# Patient Record
Sex: FEMALE | Race: WHITE | NOT HISPANIC OR LATINO | Employment: UNEMPLOYED | URBAN - METROPOLITAN AREA
[De-identification: names, ages, dates, MRNs, and addresses within clinical notes are randomized per-mention and may not be internally consistent; named-entity substitution may affect disease eponyms.]

---

## 2019-08-23 ENCOUNTER — OFFICE VISIT (OUTPATIENT)
Dept: URGENT CARE | Facility: CLINIC | Age: 24
End: 2019-08-23
Payer: COMMERCIAL

## 2019-08-23 VITALS
BODY MASS INDEX: 20.31 KG/M2 | TEMPERATURE: 97.6 F | RESPIRATION RATE: 17 BRPM | HEIGHT: 68 IN | SYSTOLIC BLOOD PRESSURE: 104 MMHG | HEART RATE: 70 BPM | WEIGHT: 134 LBS | OXYGEN SATURATION: 98 % | DIASTOLIC BLOOD PRESSURE: 80 MMHG

## 2019-08-23 DIAGNOSIS — S13.4XXA WHIPLASH INJURY TO NECK, INITIAL ENCOUNTER: ICD-10-CM

## 2019-08-23 DIAGNOSIS — M25.531 RIGHT WRIST PAIN: ICD-10-CM

## 2019-08-23 DIAGNOSIS — S76.012A HIP STRAIN, LEFT, INITIAL ENCOUNTER: ICD-10-CM
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
## 2019-08-23 DIAGNOSIS — S39.012A BACK STRAIN, INITIAL ENCOUNTER: ICD-10-CM

## 2019-08-23 PROCEDURE — 99999 PR NO CHARGE: CPT | Performed by: NURSE PRACTITIONER

## 2019-08-23 PROCEDURE — 99203 OFFICE O/P NEW LOW 30 MIN: CPT | Performed by: NURSE PRACTITIONER

## 2019-08-23 RX ORDER — KETOROLAC TROMETHAMINE 30 MG/ML
30 INJECTION, SOLUTION INTRAMUSCULAR; INTRAVENOUS ONCE
Status: COMPLETED | OUTPATIENT
Start: 2019-08-23 | End: 2019-08-23

## 2019-08-23 RX ORDER — NORGESTIMATE AND ETHINYL ESTRADIOL 7DAYSX3 28
KIT ORAL
Refills: 12 | COMMUNITY
Start: 2019-07-24 | End: 2023-02-15

## 2019-08-23 RX ADMIN — KETOROLAC TROMETHAMINE 30 MG: 30 INJECTION, SOLUTION INTRAMUSCULAR; INTRAVENOUS at 21:40

## 2019-08-23 ASSESSMENT — ENCOUNTER SYMPTOMS
NAUSEA: 0
DIZZINESS: 0
SHORTNESS OF BREATH: 0
ABDOMINAL PAIN: 0
MYALGIAS: 1
SORE THROAT: 0
FATIGUE: 0
BACK PAIN: 1
LOSS OF CONSCIOUSNESS: 0
CHANGE IN BOWEL HABIT: 0
CHILLS: 0
EYE PAIN: 0
HEADACHES: 0
VOMITING: 0
FEVER: 0
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
## 2020-11-28 ENCOUNTER — HOSPITAL ENCOUNTER (OUTPATIENT)
Facility: MEDICAL CENTER | Age: 25
End: 2020-11-28
Attending: PHYSICIAN ASSISTANT
Payer: COMMERCIAL

## 2020-11-28 ENCOUNTER — APPOINTMENT (OUTPATIENT)
Dept: URGENT CARE | Facility: CLINIC | Age: 25
End: 2020-11-28
Payer: COMMERCIAL

## 2020-11-28 ENCOUNTER — OFFICE VISIT (OUTPATIENT)
Dept: URGENT CARE | Facility: CLINIC | Age: 25
End: 2020-11-28
Payer: COMMERCIAL

## 2020-11-28 VITALS
BODY MASS INDEX: 19.7 KG/M2 | SYSTOLIC BLOOD PRESSURE: 120 MMHG | WEIGHT: 130 LBS | HEIGHT: 68 IN | HEART RATE: 107 BPM | RESPIRATION RATE: 14 BRPM | TEMPERATURE: 97.8 F | OXYGEN SATURATION: 96 % | DIASTOLIC BLOOD PRESSURE: 68 MMHG

## 2020-11-28 DIAGNOSIS — R50.9 FEVER, UNSPECIFIED FEVER CAUSE: ICD-10-CM

## 2020-11-28 PROCEDURE — 99214 OFFICE O/P EST MOD 30 MIN: CPT | Performed by: PHYSICIAN ASSISTANT

## 2020-11-28 PROCEDURE — U0003 INFECTIOUS AGENT DETECTION BY NUCLEIC ACID (DNA OR RNA); SEVERE ACUTE RESPIRATORY SYNDROME CORONAVIRUS 2 (SARS-COV-2) (CORONAVIRUS DISEASE [COVID-19]), AMPLIFIED PROBE TECHNIQUE, MAKING USE OF HIGH THROUGHPUT TECHNOLOGIES AS DESCRIBED BY CMS-2020-01-R: HCPCS

## 2020-11-28 ASSESSMENT — ENCOUNTER SYMPTOMS
ABDOMINAL PAIN: 0
MYALGIAS: 1
EYE PAIN: 0
CONSTIPATION: 0
SHORTNESS OF BREATH: 0
DIARRHEA: 0
HEADACHES: 1
FEVER: 1
VOMITING: 0
NAUSEA: 0
SORE THROAT: 0
COUGH: 0
CHILLS: 1

## 2020-11-28 NOTE — LETTER
November 28, 2020    To Whom It May Concern:         This is confirmation that Jorge Galloway attended her scheduled appointment with Silas Duarte P.A.-C. on 11/28/20.   Your employee was seen in our clinic today.  A concern for COVID-19 has been identified and testing is in progress. We are asking you to excuse absences while following self-isolation protocol per Center for Disease Control (CDC) guidelines.  Your employee will be able to access test results through our electronic delivery system called [employer].     If the results of testing are positive, your employee should follow the CDC guidelines.  These are isolation for a minimum of 10 days and at least 24 hours have passed since your last fever without the use of fever-reducing medications and all other symptoms have improved.  The health department may contact you and provide further directions regarding self-isolation and return to work.     Negative result without close contact*:  If your employee was tested due to their symptoms without close contact to someone with COVID-19 and their test is negative: your employee should not return to work or regular activities until 24 hours after symptoms fully improve. (For example, if patient feels back to normal on Tuesday, should remain isolated through Wednesday).      Negative with close contact*:  If your employee was tested due to close contact to someone, they should self isolate for 14 days after their exposure.    Negative with positive household member  If your employee was due to a positive household member they should still quarantine for a period of 14 days.  The 14 day period begins once the household member is isolated. If unable to quarantine (for example mom from infant), the CDC advises an additional 14-day quarantine period from the COVID-19 household member.   In general, repeat testing is not necessary and not offered through our [facility] urgent care.     This is the only note that will  be provided from [employer] for this visit.  Your employee will require an appointment with a primary care provider if FMLA or disability forms are required.  If you have any questions please do not hesitate to call me at the phone number listed below.    Sincerely,    LISA Alba.-C.  [phone]

## 2020-11-28 NOTE — LETTER
November 28, 2020    To Whom It May Concern:         This is confirmation that Jorge Galloway attended her scheduled appointment with Silas Duarte P.A.-C. on 11/28/20.         If you have any questions please do not hesitate to call me at the phone number listed below.    Sincerely,          Silas Duarte P.A.-C.  [phone]

## 2020-11-29 DIAGNOSIS — R50.9 FEVER, UNSPECIFIED FEVER CAUSE: ICD-10-CM

## 2020-11-29 NOTE — PROGRESS NOTES
"Subjective:   Jorge Galloway is a 24 y.o. female who presents for Fever (headache x1 day, 18 days postpartum)      This is a 24-year-old female 18 days status post partum presenting complaining of a fever, body aches, headache that is been ongoing since early yesterday.  She has been treating appropriately with antidiuretics.  I questioned her thoroughly and she denies: Vaginal discharge or pelvic pain, breast redness or breast discharge or breast pain, pain around epidural site or other possible pregnancy complication and she denied all.  She has no known sick contacts other than her hospitalization for delivery.  She has not noticed a cough, shortness of breath, nausea or diarrhea      Review of Systems   Constitutional: Positive for chills, fever and malaise/fatigue.   HENT: Negative for congestion, ear pain and sore throat.    Eyes: Negative for pain.   Respiratory: Negative for cough and shortness of breath.    Cardiovascular: Negative for chest pain.   Gastrointestinal: Negative for abdominal pain, constipation, diarrhea, nausea and vomiting.   Genitourinary: Negative for dysuria.   Musculoskeletal: Positive for myalgias.   Skin: Negative for rash.   Neurological: Positive for headaches.       Medications:    • Tri-Sprintec Tabs    Allergies: Patient has no known allergies.    Problem List: Jorge Galloway does not have a problem list on file.    Surgical History:  No past surgical history on file.    Past Social Hx: Jorge Galloway  reports that she has never smoked. She has never used smokeless tobacco.     Past Family Hx:  Jorge Galloway family history is not on file.     Problem list, medications, and allergies reviewed by myself today in Epic.     Objective:     /68 (BP Location: Left arm, Patient Position: Sitting, BP Cuff Size: Adult)   Pulse (!) 107   Temp 36.6 °C (97.8 °F) (Temporal)   Resp 14   Ht 1.727 m (5' 8\")   Wt 59 kg (130 lb)   SpO2 96%   BMI 19.77 kg/m²     Physical Exam  Vitals signs " reviewed.   Constitutional:       Appearance: Normal appearance.   HENT:      Head: Normocephalic and atraumatic.      Right Ear: External ear normal.      Left Ear: External ear normal.      Nose: Nose normal.      Mouth/Throat:      Mouth: Mucous membranes are moist.   Eyes:      Conjunctiva/sclera: Conjunctivae normal.   Cardiovascular:      Rate and Rhythm: Normal rate and regular rhythm.      Heart sounds: Normal heart sounds.   Pulmonary:      Effort: Pulmonary effort is normal.   Skin:     General: Skin is warm and dry.      Capillary Refill: Capillary refill takes less than 2 seconds.   Neurological:      Mental Status: She is alert and oriented to person, place, and time.         Assessment/Plan:     Diagnosis and associated orders:     1. Fever, unspecified fever cause  COVID/SARS COV-2 PCR      Comments/MDM:     • I was concerned about mastitis versus other pregnancy or breast-feeding concerns none of which are consistent with her history and physical.  I am concerned about a viral infection.  She has been taking very low doses of antipyretics and I encouraged her to take reasonable doses.  She measured a fever up to 102 yesterday so I do believe that there is something going.  I counseled her to remain vigilant if any other symptoms surface that change the nature of her illness to return for evaluation or consider going to the ER based on severity  Patient's vital signs are reassuring and they appear hemodynamically stable and do not require higher level care at this time  I discussed self isolation and provided printed instructions (if applicable)  I discussed ER precautions and provided printed instructions (if applicable)  I educated the patient on possibility of a false-negative test and indications for repeat testing  I instructed the patient to try to follow up with their PCP (if applicable) for follow up care  I provided the patient the printed AVS which contains information about signing up for  [employer]   I will contact the patient via [employer] with Covid results.  If requested, I provided the patient with a work note to provide to their employer or school regarding returning to work and discontinuation of self isolation.  All questions were answered and patient demonstrated verbal understanding of above.  I followed all reasonable PPE precautions during this encounter including but not limited to use of an N95 mask, gloves, and gown if indicated.    •          Differential diagnosis, natural history, supportive care, and indications for immediate follow-up discussed.    Advised the patient to follow-up with the primary care physician for recheck, reevaluation, and consideration of further management.    Please note that this dictation was created using voice recognition software. I have made a reasonable attempt to correct obvious errors, but I expect that there are errors of grammar and possibly content that I did not discover before finalizing the note.    This note was electronically signed by Silas Duarte PA-C

## 2020-11-30 LAB
COVID ORDER STATUS COVID19: NORMAL
SARS-COV-2 RNA RESP QL NAA+PROBE: NOTDETECTED
SPECIMEN SOURCE: NORMAL

## 2020-12-01 NOTE — RESULT ENCOUNTER NOTE
I spoke with the patient to inform them of their NEGATIVE Covid-19 PCR testing results.  I referenced their AVS paperwork and our office visit reiterating supportive care, ER precautions, and possible continuation of isolation.  I referred them to the CDC guidelines or those of their employer about returning to work/school.

## 2021-10-26 ENCOUNTER — TELEPHONE (OUTPATIENT)
Dept: SCHEDULING | Facility: IMAGING CENTER | Age: 26
End: 2021-10-26

## 2021-11-02 ENCOUNTER — OFFICE VISIT (OUTPATIENT)
Dept: MEDICAL GROUP | Facility: CLINIC | Age: 26
End: 2021-11-02
Payer: COMMERCIAL

## 2021-11-02 VITALS
SYSTOLIC BLOOD PRESSURE: 124 MMHG | TEMPERATURE: 98 F | RESPIRATION RATE: 12 BRPM | HEART RATE: 100 BPM | HEIGHT: 68 IN | BODY MASS INDEX: 20 KG/M2 | DIASTOLIC BLOOD PRESSURE: 85 MMHG | OXYGEN SATURATION: 96 % | WEIGHT: 132 LBS

## 2021-11-02 DIAGNOSIS — Z72.0 NICOTINE USE: ICD-10-CM

## 2021-11-02 DIAGNOSIS — Z3A.01 LESS THAN 8 WEEKS GESTATION OF PREGNANCY: ICD-10-CM

## 2021-11-02 DIAGNOSIS — Z71.85 VACCINE COUNSELING: ICD-10-CM

## 2021-11-02 PROCEDURE — 99214 OFFICE O/P EST MOD 30 MIN: CPT | Performed by: STUDENT IN AN ORGANIZED HEALTH CARE EDUCATION/TRAINING PROGRAM

## 2021-11-02 NOTE — ASSESSMENT & PLAN NOTE
Offered nicotine patches which patient declined for now  Recommended further discussion with OB  Recommended she abstain from nicotine and soon as possible given developing fetus especially during the first 8 weeks  Follow-up as needed

## 2021-11-02 NOTE — ASSESSMENT & PLAN NOTE
Continue to follow with Dr. Loya with OB.  If other issues arise happy to consult during pregnancy.

## 2021-11-02 NOTE — ASSESSMENT & PLAN NOTE
Recommended patient get Covid vaccine as soon as possible  Commended she avoid Likeability in favor of Pfizer or maternal vaccines given theoretical risk of clots although I suspect that risk is actually lower than the incidence in the community at baseline.  Follow-up as needed  Continue with OB

## 2021-11-02 NOTE — PROGRESS NOTES
Subjective:     CC: vaccine counseling and nicotine use    HPI:   Jorge presents today with     Problem   Vaccine Counseling    Patient here to discuss Covid vaccine. She recently found out she was pregnant. She is about 5 weeks pregnant based on her LMP. She recently got her flu shot 1 week ago. She was asking if it was safe enough to get the Covid vaccine during pregnancy and following flu shot. She has no previous vaccine reactions and no previous anaphylaxis. She has no OB initial appointment next Monday 6 days from now.     Nicotine Use    Patient is currently vaping. She picked this up after her last pregnancy. She would like to quit during this pregnancy. We discussed the options of patches and warned against overdose if using with vape pens. I discussed that vape pens have a higher concentration of nicotine than cigarettes. She states she has hit her vape pen in the last couple days and has been chewing gums a lot more frequently. She would like to discuss this further with OB.     Less Than 8 Weeks Gestation of Pregnancy    Patient recently took a home pregnancy test. She states this was positive. By her last LMP she would be roughly 5 weeks. She has an initial OB visit set up with Dr. Loya. She has no concerns at this point. She is at a healthy weight with a BMI of 20. She has no previous issues with high blood pressure or preeclampsia in previous pregnancy.         Current Outpatient Medications Ordered in Epic   Medication Sig Dispense Refill   • TRI-SPRINTEC 0.18/0.215/0.25 MG-35 MCG Tab TK 1 T PO QD (Patient not taking: Reported on 11/2/2021)  12     No current [facility]-ordered facility-administered medications on file.       ROS:  Gen: no fevers/chills, no changes in weight  Eyes: no changes in vision  ENT: no sore throat, no hearing loss, no bloody nose  Pulm: no SOB, no cough  CV: no chest pain, no palpitations  GI: no nausea/vomiting, no diarrhea  : no dysuria  MSk: no myalgias  Skin: no  "rash  Neuro: no headaches, no numbness/tingling  Heme/Lymph: no easy bruising      Objective:     Exam:  /85 (BP Location: Right arm, Patient Position: Sitting, BP Cuff Size: Adult)   Pulse 100   Temp 36.7 °C (98 °F) (Temporal)   Resp 12   Ht 1.727 m (5' 8\")   Wt 59.9 kg (132 lb)   SpO2 96%   BMI 20.07 kg/m²  Body mass index is 20.07 kg/m².    Gen: Alert and oriented, No apparent distress.  HEENT: PERRL, EOMI, external ears normal bilat, nose roughly midline, atraumatic, normocephalic  Neck: Neck is supple without lymphadenopathy.  Lungs: CTA bilaterally, no wheezes, rhonchi, or rales, symmetric chest rise  CV: Regular rate and rhythm. No murmurs, rubs, or gallops.  GI:       No rebound, no guarding, normal bowel sounds  :      No CVA tenderness, bladder non-tender to palp  Ext: No clubbing, cyanosis, edema.  Neuro: Gait appropriate for age, no focal deficits  Psych: Affect and mood congruent without abnormality  Skin:    No rashes, no jaundice      Labs: none    Assessment & Plan:     25 y.o. female with the following -     Problem List Items Addressed This Visit     Vaccine counseling     Recommended patient get Covid vaccine as soon as possible  Commended she avoid CL3VER in favor of Pfizer or maternal vaccines given theoretical risk of clots although I suspect that risk is actually lower than the incidence in the community at baseline.  Follow-up as needed  Continue with OB         Nicotine use     Offered nicotine patches which patient declined for now  Recommended further discussion with OB  Recommended she abstain from nicotine and soon as possible given developing fetus especially during the first 8 weeks  Follow-up as needed           Less than 8 weeks gestation of pregnancy     Continue to follow with Dr. Loya with OB.  If other issues arise happy to consult during pregnancy.               I spent a total of 30 minutes with record review, exam, communication with the patient, " communication with other providers, and documentation of this encounter.      Return if symptoms worsen or fail to improve.    Please note that this dictation was created using voice recognition software. I have made every reasonable attempt to correct obvious errors, but I expect that there are errors of grammar and possibly content that I did not discover before finalizing the note.

## 2023-02-15 ENCOUNTER — OFFICE VISIT (OUTPATIENT)
Dept: MEDICAL GROUP | Facility: CLINIC | Age: 28
End: 2023-02-15
Payer: COMMERCIAL

## 2023-02-15 VITALS
SYSTOLIC BLOOD PRESSURE: 99 MMHG | HEART RATE: 60 BPM | DIASTOLIC BLOOD PRESSURE: 63 MMHG | OXYGEN SATURATION: 95 % | WEIGHT: 132 LBS | TEMPERATURE: 97.8 F | BODY MASS INDEX: 20 KG/M2 | RESPIRATION RATE: 12 BRPM | HEIGHT: 68 IN

## 2023-02-15 DIAGNOSIS — Z00.00 HEALTHCARE MAINTENANCE: ICD-10-CM

## 2023-02-15 DIAGNOSIS — M54.50 LUMBAR BACK PAIN: ICD-10-CM

## 2023-02-15 PROCEDURE — 99213 OFFICE O/P EST LOW 20 MIN: CPT | Mod: GE

## 2023-02-16 NOTE — PROGRESS NOTES
"Subjective:     CC: Establish care and Nexplanon desired.    HPI:   Jorge is 26 yo female with unremarkable PMHx, however, significant for her  status who is here to establish care. Patient states she has been wanting to get an IUD or nexplanon placed for a couple months however, was not appropriately followed up with from her previous OBGYN office. Patient delivered her most recent baby 2 months ago and since then has been feeling well. Patient is interested in placing a Nexplanon as soon as possible. She denies irregular or heavy periods. Patient has no other comorbidities and takes no medications.       ROS:  Gen: no fevers/chills, no changes in weight  Pulm: no sob, no cough  CV: no chest pain, no palpitations  GI: no nausea/vomiting, no diarrhea  Objective:     Exam:  BP 99/63 (BP Location: Left arm, Patient Position: Sitting, BP Cuff Size: Adult)   Pulse 60   Temp 36.6 °C (97.8 °F) (Temporal)   Resp 12   Ht 1.727 m (5' 8\")   Wt 59.9 kg (132 lb)   SpO2 95%   BMI 20.07 kg/m²  Body mass index is 20.07 kg/m².    Gen: Alert and oriented, No apparent distress.  Neck: Neck is supple without lymphadenopathy.  Lungs: Normal effort, CTA bilaterally, no wheezes, rhonchi, or rales  CV: Regular rate and rhythm. No murmurs, rubs, or gallops.  Ext: No clubbing, cyanosis, edema.    Labs: No new labs ordered today.  Assessment & Plan:   27 y.o. female with the following:    #Lumbar back pain  -continue supportive treatment with NSAIDs OTC and warm compresses  -patient was advised to notify if any new or concerning symptoms present; numbness or tingling in upper arms or back, loss of sensation or motor function in upper extremities  -negative neuro examination today  -consider imaging if pain persists following supportive measures mentioned above    #Contraception  -patient interested in placing Nexplanon  -side effects and procedure disgusts in detail today  -Nexplanon application provided today; will obtain and " submit for ordering/processing.  -patient will be contacted when Nexplanon becomes available in our clinic    #Care Gaps:  -PAP done previously; will obtain results and update patient's chart    Regine Paula M.D., PGY1  Resident Intern  [employer], Family Medicine

## 2023-03-27 ENCOUNTER — APPOINTMENT (OUTPATIENT)
Dept: MEDICAL GROUP | Facility: CLINIC | Age: 28
End: 2023-03-27
Payer: COMMERCIAL

## 2023-04-06 ENCOUNTER — OFFICE VISIT (OUTPATIENT)
Dept: MEDICAL GROUP | Facility: CLINIC | Age: 28
End: 2023-04-06
Payer: COMMERCIAL

## 2023-04-06 VITALS
HEIGHT: 68 IN | OXYGEN SATURATION: 98 % | DIASTOLIC BLOOD PRESSURE: 64 MMHG | SYSTOLIC BLOOD PRESSURE: 100 MMHG | BODY MASS INDEX: 20.61 KG/M2 | TEMPERATURE: 97.8 F | HEART RATE: 77 BPM | WEIGHT: 136 LBS

## 2023-04-06 DIAGNOSIS — Z30.017 NEXPLANON INSERTION: ICD-10-CM

## 2023-04-06 DIAGNOSIS — Z30.017 INSERTION OF NEXPLANON: ICD-10-CM

## 2023-04-06 LAB
POCT INT CON NEG: NEGATIVE
POCT INT CON POS: POSITIVE
POCT URINE PREGNANCY TEST: NEGATIVE

## 2023-04-06 PROCEDURE — 11981 INSERTION DRUG DLVR IMPLANT: CPT

## 2023-04-06 PROCEDURE — 81025 URINE PREGNANCY TEST: CPT

## 2023-04-06 ASSESSMENT — PATIENT HEALTH QUESTIONNAIRE - PHQ9: CLINICAL INTERPRETATION OF PHQ2 SCORE: 0
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
## 2023-04-06 NOTE — PROCEDURES
Procedure Note: Nexplanon Insertion     Today I discussed with the patient subdermal implant, Nexplanon.  We discussed its mechanism of action and how it prevents pregnancy.  Discussed that the implant is a progesterone only form of contraception.  Also discussed with patient risks associated with placement of the device which can include infection bruising and pain. We also discussed the possibility of the device can be displaced. We discussed that the device would be palpable under the skin of the arm.  I also discussed with the patient side effects of the device which can include but are not limited to, irregular bleeding which can include amenorrhea, irregular spotting and also worsening of menstrual cycles. There is an increased risk of headaches related to the device and potential for weight gain.  After thorough discussion the patient had opportunity to ask questions regarding the device and at this time desires the device to be placed today.  Patient information handout has been given for the device.    After informed consent and discussion of risks and benefits, patient who describes her dominant hand as right, was prepped and draped to expose the inner medial surface of the upper left.  Insertion site marked at 8 cm distal to the medial epicondyl  1% lidocaine placed sub-Q along course of implant with area totally anesthestized.   Nexplanon applicator placed with bevel of needle down at 30 deg angle. Skin lifted, needle angle changed to parallel to skin and advanced to hub. Trigger fired and nexplanon released.  Implant palpated by myself and patient in proper location.   Small needle puncture hemostatic   Steri Strips placed   Arm wrapped with self sealing gauze     Patient given Postoperative Advice  Take NSAIDS and tylenol for pain, ice packs prn bruising/discomfort.  Remove gauze wrap after 24 hrs, or at anytime it becomes uncomfortable.  Steri Strips to be removed at 3-4 days.  Instructed patient to  use barrier method for up to 7 days if sexually active.  RTC as needed.    Regine Paula MD  Resident Intern  [facility], Family Medicine

## 2023-04-07 NOTE — ASSESSMENT & PLAN NOTE
Consent signed prior to Nexplanon insertion. Nexplanon placed today with success. patient tolerated procedure well.  Consent signed prior to procedure.   -Please see procedure note from today for 4/6/2023.  -Return to clinic and postprocedure instructions discussed with patient.  -Informed patient to continue barrier method for contraception for up to 7 days following insertion.

## 2023-06-19 ENCOUNTER — OFFICE VISIT (OUTPATIENT)
Dept: MEDICAL GROUP | Facility: CLINIC | Age: 28
End: 2023-06-19
Payer: COMMERCIAL

## 2023-06-19 VITALS — BODY MASS INDEX: 19.91 KG/M2 | HEIGHT: 68 IN | WEIGHT: 131.4 LBS

## 2023-06-19 DIAGNOSIS — H93.8X2 SENSATION OF FULLNESS IN LEFT EAR: ICD-10-CM

## 2023-06-19 PROCEDURE — 99213 OFFICE O/P EST LOW 20 MIN: CPT | Mod: GE

## 2023-06-19 NOTE — PROGRESS NOTES
"Subjective:     CC: Ear fullness.    HPI:   Jorge is a 27-year-old female with unremarkable past medical history who presents today for ear fullness.  Patient denies ear pain, nausea, vomiting, dizziness, symptoms of vertigo and/or jaw pain or tightness.  No active drainage or bleeding from ear.  Patient's symptoms are mostly left-sided.  Right ear completely asymptomatic.  History of seasonal allergies.  No other concerns at this time.    ROS:  Gen: no fevers/chills, no changes in weight  Pulm: no sob, no cough  CV: no chest pain, no palpitations  GI: no nausea/vomiting, no diarrhea  Objective:   Exam:  BP (P) 122/70 (BP Location: Right arm, Patient Position: Sitting, BP Cuff Size: Adult)   Pulse (P) 74   Temp (P) 36.9 °C (98.5 °F) (Temporal)   Ht 1.727 m (5' 8\")   Wt 59.6 kg (131 lb 6.4 oz)   SpO2 (P) 99%   BMI 19.98 kg/m²  Body mass index is 19.98 kg/m².    Gen: Alert and oriented, No apparent distress.  HEENT: TM intact, no bulging or surrounding erythema.  No active drainage or bleeding on exam.  No cerumen impaction noted.  No mastoid tenderness or pain with pinna manipulation.  Neck is supple without lymphadenopathy.  Lungs: Normal effort, CTA bilaterally, no wheezes, rhonchi, or rales  CV: Regular rate and rhythm. No murmurs, rubs, or gallops.  Ext: No clubbing, cyanosis, edema.    Labs: No new labs ordered or reviewed during today's visit.   Assessment & Plan:   27 y.o. female with the following:    Problem List Items Addressed This Visit       Sensation of fullness in left ear     Left-sided complaints.  Right ear within normal limits.  Denies ear pain at this time.  No mastoid tenderness or pain with movement of pinna.  On physical exam ear canal without discharge, surrounding erythema, no bulging of TM.   Rhinnes wnl; air conduction > bone conduction.  Webers wnl; No lateralization of sound.   Considering Eustachian tube dysfunction vs. TMJ dysfunction.  -We will continue to monitor for worsening " or continued symptoms over the next 2 to 4 weeks.  -Return precautions provided to patient today.  -We will consider Flonase to relieve possible allergic type symptoms related to pressure and congestion.  -Consider imaging and/or ENT referral; if progression of symptoms or new symptoms present.          At next visit:  -Just discussed ongoing symptoms of ear fullness.  Consider Flonase, or imaging if indicated.    Regine Paula MD  Resident Intern  [facility], Family Medicine

## 2023-06-19 NOTE — ASSESSMENT & PLAN NOTE
Left-sided complaints.  Right ear within normal limits.  Denies ear pain at this time.  No mastoid tenderness or pain with movement of pinna.  On physical exam ear canal without discharge, surrounding erythema, no bulging of TM.   Rhinnes wnl; air conduction > bone conduction.  Webers wnl; No lateralization of sound.   Considering Eustachian tube dysfunction vs. TMJ dysfunction.  -We will continue to monitor for worsening or continued symptoms over the next 2 to 4 weeks.  -Return precautions provided to patient today.  -We will consider Flonase to relieve possible allergic type symptoms related to pressure and congestion.  -Consider imaging and/or ENT referral; if progression of symptoms or new symptoms present.

## 2024-01-29 ENCOUNTER — APPOINTMENT (OUTPATIENT)
Dept: LAB | Facility: MEDICAL CENTER | Age: 29
End: 2024-01-29
Payer: COMMERCIAL

## 2024-02-15 ENCOUNTER — OFFICE VISIT (OUTPATIENT)
Dept: MEDICAL GROUP | Facility: CLINIC | Age: 29
End: 2024-02-15
Payer: COMMERCIAL

## 2024-02-15 VITALS
SYSTOLIC BLOOD PRESSURE: 117 MMHG | HEIGHT: 68 IN | TEMPERATURE: 97.7 F | HEART RATE: 67 BPM | OXYGEN SATURATION: 98 % | BODY MASS INDEX: 21.07 KG/M2 | WEIGHT: 139 LBS | DIASTOLIC BLOOD PRESSURE: 75 MMHG

## 2024-02-15 DIAGNOSIS — R11.2 NAUSEA AND VOMITING, UNSPECIFIED VOMITING TYPE: ICD-10-CM

## 2024-02-15 DIAGNOSIS — R35.0 URINARY FREQUENCY: ICD-10-CM

## 2024-02-15 DIAGNOSIS — Z30.017 INSERTION OF NEXPLANON: ICD-10-CM

## 2024-02-15 DIAGNOSIS — Z00.00 PREVENTATIVE HEALTH CARE: ICD-10-CM

## 2024-02-15 DIAGNOSIS — R53.83 OTHER FATIGUE: ICD-10-CM

## 2024-02-15 LAB
APPEARANCE UR: CLEAR
BILIRUB UR STRIP-MCNC: NORMAL MG/DL
COLOR UR AUTO: YELLOW
GLUCOSE UR STRIP.AUTO-MCNC: NORMAL MG/DL
KETONES UR STRIP.AUTO-MCNC: NORMAL MG/DL
LEUKOCYTE ESTERASE UR QL STRIP.AUTO: NORMAL
NITRITE UR QL STRIP.AUTO: NORMAL
PH UR STRIP.AUTO: 7 [PH] (ref 5–8)
POCT INT CON NEG: NEGATIVE
POCT INT CON POS: POSITIVE
POCT URINE PREGNANCY TEST: NEGATIVE
PROT UR QL STRIP: NORMAL MG/DL
RBC UR QL AUTO: NORMAL
SP GR UR STRIP.AUTO: 1.01
UROBILINOGEN UR STRIP-MCNC: 0.2 MG/DL

## 2024-02-15 PROCEDURE — 99213 OFFICE O/P EST LOW 20 MIN: CPT | Mod: 25,GE

## 2024-02-15 PROCEDURE — 3078F DIAST BP <80 MM HG: CPT | Mod: GC

## 2024-02-15 PROCEDURE — 90686 IIV4 VACC NO PRSV 0.5 ML IM: CPT

## 2024-02-15 PROCEDURE — 90471 IMMUNIZATION ADMIN: CPT

## 2024-02-15 PROCEDURE — 81002 URINALYSIS NONAUTO W/O SCOPE: CPT | Mod: GC

## 2024-02-15 PROCEDURE — 81025 URINE PREGNANCY TEST: CPT | Mod: GC

## 2024-02-15 PROCEDURE — 3074F SYST BP LT 130 MM HG: CPT | Mod: GC

## 2024-02-16 NOTE — PROGRESS NOTES
"Subjective:     CC: Nexplanon check.    HPI:   Jorge presents today to ensure nexplanon is still in place. Recently got a deep tissue massage and since then she felt her nexplanon is displaced, as she was unable to palpate it. Patient also states she has been experiencing some intermittent morning nausea, since this massage as well.     No other complaints during this time.    ROS:  Gen: no fevers/chills, no changes in weight  Pulm: no sob, no cough  CV: no chest pain, no palpitations  GI: no nausea/vomiting, no diarrhea    Objective:     Exam:  /75 (BP Location: Right arm, Patient Position: Sitting, BP Cuff Size: Adult)   Pulse 67   Temp 36.5 °C (97.7 °F) (Temporal)   Ht 1.727 m (5' 8\")   Wt 63 kg (139 lb)   SpO2 98%   BMI 21.13 kg/m²  Body mass index is 21.13 kg/m².    Gen: Alert and oriented, No apparent distress.  Neck: Neck is supple without lymphadenopathy.  Lungs: Normal effort, CTA bilaterally, no wheezes, rhonchi, or rales  CV: Regular rate and rhythm. No murmurs, rubs, or gallops.  Ext: No clubbing, cyanosis, edema.    Labs:   Results for orders placed or performed in visit on 02/15/24   POCT Urinalysis   Result Value Ref Range    POC Color yellow Negative    POC Appearance clear Negative    POC Glucose neg Negative mg/dL    POC Bilirubin neg Negative mg/dL    POC Ketones neg Negative mg/dL    POC Specific Gravity 1.015 <1.005 - >1.030    POC Blood neg Negative    POC Urine PH 7.0 5.0 - 8.0    POC Protein neg Negative mg/dL    POC Urobiligen 0.2 Negative (0.2) mg/dL    POC Nitrites neg Negative    POC Leukocyte Esterase trace Negative   POCT Pregnancy   Result Value Ref Range    POC Urine Pregnancy Test Negative     Internal Control Positive Positive     Internal Control Negative Negative      Assessment & Plan:     28 y.o. female with the following:     Problem List Items Addressed This Visit       Insertion of Nexplanon     Successfully placed last year 4/2023. Asymptomatic and doing well " since placement. Recent deep tissue massage and patient was concerned for displacement. Identified correct location of nexplanon in left upper arm today.     Some nausea since deep tissue massage.    Plan:  - UA and b-HCG today; all negative with exception of +LE. Patient asymptomatic from UTI symptoms.              Other Visit Diagnoses       Preventative health care        Relevant Orders    INFLUENZA VACCINE QUAD INJ (PF) (Completed)    Urinary frequency        Relevant Orders    POCT Urinalysis (Completed)    Other fatigue        Relevant Orders    CBC WITH DIFFERENTIAL    Comp Metabolic Panel    TSH    HEMOGLOBIN A1C    POCT Pregnancy (Completed)    Nausea and vomiting, unspecified vomiting type        Relevant Orders    POCT Pregnancy (Completed)           Advised to follow up for further workup in 1-2 weeks if symptoms of nausea return or persist. Pregnancy test today negative.    Regine Paula M.D.  PGY2 Resident  [facility], [facility]

## 2024-02-16 NOTE — ASSESSMENT & PLAN NOTE
Successfully placed last year 4/2023. Asymptomatic and doing well since placement. Recent deep tissue massage and patient was concerned for displacement. Identified correct location of nexplanon in left upper arm today.     Some nausea since deep tissue massage.    Plan:  - UA and b-HCG today; all negative with exception of +LE. Patient asymptomatic from UTI symptoms.

## 2024-06-27 ENCOUNTER — OFFICE VISIT (OUTPATIENT)
Dept: MEDICAL GROUP | Facility: CLINIC | Age: 29
End: 2024-06-27
Payer: COMMERCIAL

## 2024-06-27 VITALS
SYSTOLIC BLOOD PRESSURE: 105 MMHG | RESPIRATION RATE: 12 BRPM | OXYGEN SATURATION: 92 % | HEIGHT: 68 IN | BODY MASS INDEX: 20.76 KG/M2 | DIASTOLIC BLOOD PRESSURE: 69 MMHG | TEMPERATURE: 98.2 F | WEIGHT: 137 LBS | HEART RATE: 76 BPM

## 2024-06-27 DIAGNOSIS — M54.2 NECK PAIN: ICD-10-CM

## 2024-06-27 RX ORDER — METHOCARBAMOL 500 MG/1
1000 TABLET, FILM COATED ORAL 4 TIMES DAILY
Qty: 24 TABLET | Refills: 0 | Status: SHIPPED | OUTPATIENT
Start: 2024-06-27 | End: 2024-06-30

## 2024-06-27 NOTE — PROGRESS NOTES
"Subjective:     CC: Neck and shoulder pain.    HPI:   Jorge is a 28-year-old female who presents today for:    Neck and shoulder pain: Acute, over past few months.  Uncontrolled and feels symptoms are worsening more recently. Complaints include tightness, stiffness with movement pain in left shoulder and neck area.  No history of trauma or injury.  Right side dominant, however left side is affected.  Patient states that she does use her left arm to carry her children.  Mornings are particularly difficult as patient wakes up with tightness on the left side.  Patient does not complain of weakness in the muscles however feels distracted in her appointment as she feels strained.    No other concerns were addressed during today's visit.  No previous history of PT or injections.  We discussed the benefits of OMT and other muscle release techniques.  Patient is interested in this form of treatment.  Please see assessment plan for further details.    ROS:  Gen: no fevers/chills, no changes in weight  Pulm: no sob, no cough  CV: no chest pain, no palpitations  GI: no nausea/vomiting, no diarrhea    Objective:     Exam:  /69 (BP Location: Left arm, Patient Position: Sitting, BP Cuff Size: Adult)   Pulse 76   Temp 36.8 °C (98.2 °F) (Temporal)   Resp 12   Ht 1.727 m (5' 8\")   Wt 62.1 kg (137 lb)   SpO2 92%   BMI 20.83 kg/m²  Body mass index is 20.83 kg/m².    Gen: Alert and oriented, No apparent distress.  Neck: Neck is supple without lymphadenopathy.  Lungs: Normal effort, CTA bilaterally, no wheezes, rhonchi, or rales  CV: Regular rate and rhythm. No murmurs, rubs, or gallops.  MSK:  shoulder: forward flexion, abduction and ext/int rotation wnl. negative neer, hawkin, and lift off testing. Empty can test negative. Apprehension test negative.   Ext: No clubbing, cyanosis, edema.    Labs:  Labs pending.  Results for orders placed or performed in visit on 02/15/24   POCT Urinalysis   Result Value Ref Range    POC " Color yellow Negative    POC Appearance clear Negative    POC Glucose neg Negative mg/dL    POC Bilirubin neg Negative mg/dL    POC Ketones neg Negative mg/dL    POC Specific Gravity 1.015 <1.005 - >1.030    POC Blood neg Negative    POC Urine PH 7.0 5.0 - 8.0    POC Protein neg Negative mg/dL    POC Urobiligen 0.2 Negative (0.2) mg/dL    POC Nitrites neg Negative    POC Leukocyte Esterase trace Negative   POCT Pregnancy   Result Value Ref Range    POC Urine Pregnancy Test Negative     Internal Control Positive Positive     Internal Control Negative Negative      Assessment & Plan:     28 y.o. female with the following:     Problem List Items Addressed This Visit       Neck pain     Likely muscular. Shoulder exam grossly negative.  Positive for muscle tightness, restricted range of motion due to tightness and straining.  Uses ibuprofen intermittently which provides some relief.  No topicals.  Patient physical exam is further details.    Plan:  -Refer to Dr. Lizet Mas for OMT, appt scheduled  -Diclofenac topical PRN  -Robaxin nightly PRN  -Consider PT, imaging or other relaxation techniques if failed above modalities.             At next follow up visit in 4 weeks:  - Ensure OMT is working well for patient  - Ensure muscle relaxant relieved patient's     Regine Paula M.D.  PGY2 Resident  [facility], [facility]

## 2024-06-28 PROBLEM — M54.2 NECK PAIN: Status: ACTIVE | Noted: 2024-06-28

## 2024-06-28 NOTE — ASSESSMENT & PLAN NOTE
Likely muscular. Shoulder exam grossly negative.  Positive for muscle tightness, restricted range of motion due to tightness and straining.  Uses ibuprofen intermittently which provides some relief.  No topicals.  Patient physical exam is further details.    Plan:  -Refer to Dr. Lizet Mas for OMT, appt scheduled  -Diclofenac topical PRN  -Robaxin nightly PRN  -Consider PT, imaging or other relaxation techniques if failed above modalities.

## 2024-07-01 ENCOUNTER — HOSPITAL ENCOUNTER (OUTPATIENT)
Facility: MEDICAL CENTER | Age: 29
End: 2024-07-01
Payer: COMMERCIAL

## 2024-07-01 ENCOUNTER — HOSPITAL ENCOUNTER (OUTPATIENT)
Dept: LAB | Facility: MEDICAL CENTER | Age: 29
End: 2024-07-01
Payer: COMMERCIAL

## 2024-07-01 DIAGNOSIS — R53.83 OTHER FATIGUE: ICD-10-CM

## 2024-07-01 LAB
ALBUMIN SERPL BCP-MCNC: 4.5 G/DL (ref 3.2–4.9)
ALBUMIN/GLOB SERPL: 1.7 G/DL
ALP SERPL-CCNC: 47 U/L (ref 30–99)
ALT SERPL-CCNC: 12 U/L (ref 2–50)
ANION GAP SERPL CALC-SCNC: 10 MMOL/L (ref 7–16)
AST SERPL-CCNC: 14 U/L (ref 12–45)
BASOPHILS # BLD AUTO: 0.6 % (ref 0–1.8)
BASOPHILS # BLD: 0.05 K/UL (ref 0–0.12)
BILIRUB SERPL-MCNC: 0.5 MG/DL (ref 0.1–1.5)
BUN SERPL-MCNC: 14 MG/DL (ref 8–22)
CALCIUM ALBUM COR SERPL-MCNC: 9.1 MG/DL (ref 8.5–10.5)
CALCIUM SERPL-MCNC: 9.5 MG/DL (ref 8.5–10.5)
CHLORIDE SERPL-SCNC: 102 MMOL/L (ref 96–112)
CO2 SERPL-SCNC: 25 MMOL/L (ref 20–33)
CREAT SERPL-MCNC: 0.76 MG/DL (ref 0.5–1.4)
EOSINOPHIL # BLD AUTO: 0.36 K/UL (ref 0–0.51)
EOSINOPHIL NFR BLD: 4.4 % (ref 0–6.9)
ERYTHROCYTE [DISTWIDTH] IN BLOOD BY AUTOMATED COUNT: 42.7 FL (ref 35.9–50)
EST. AVERAGE GLUCOSE BLD GHB EST-MCNC: 108 MG/DL
GFR SERPLBLD CREATININE-BSD FMLA CKD-EPI: 108 ML/MIN/1.73 M 2
GLOBULIN SER CALC-MCNC: 2.7 G/DL (ref 1.9–3.5)
GLUCOSE SERPL-MCNC: 86 MG/DL (ref 65–99)
HBA1C MFR BLD: 5.4 % (ref 4–5.6)
HCT VFR BLD AUTO: 42 % (ref 37–47)
HGB BLD-MCNC: 13.8 G/DL (ref 12–16)
IMM GRANULOCYTES # BLD AUTO: 0.01 K/UL (ref 0–0.11)
IMM GRANULOCYTES NFR BLD AUTO: 0.1 % (ref 0–0.9)
LYMPHOCYTES # BLD AUTO: 2.7 K/UL (ref 1–4.8)
LYMPHOCYTES NFR BLD: 32.9 % (ref 22–41)
MCH RBC QN AUTO: 29.6 PG (ref 27–33)
MCHC RBC AUTO-ENTMCNC: 32.9 G/DL (ref 32.2–35.5)
MCV RBC AUTO: 89.9 FL (ref 81.4–97.8)
MONOCYTES # BLD AUTO: 0.45 K/UL (ref 0–0.85)
MONOCYTES NFR BLD AUTO: 5.5 % (ref 0–13.4)
NEUTROPHILS # BLD AUTO: 4.64 K/UL (ref 1.82–7.42)
NEUTROPHILS NFR BLD: 56.5 % (ref 44–72)
NRBC # BLD AUTO: 0 K/UL
NRBC BLD-RTO: 0 /100 WBC (ref 0–0.2)
PLATELET # BLD AUTO: 378 K/UL (ref 164–446)
PMV BLD AUTO: 10.2 FL (ref 9–12.9)
POTASSIUM SERPL-SCNC: 4.9 MMOL/L (ref 3.6–5.5)
PROT SERPL-MCNC: 7.2 G/DL (ref 6–8.2)
RBC # BLD AUTO: 4.67 M/UL (ref 4.2–5.4)
SODIUM SERPL-SCNC: 137 MMOL/L (ref 135–145)
TSH SERPL DL<=0.005 MIU/L-ACNC: 2.05 UIU/ML (ref 0.38–5.33)
WBC # BLD AUTO: 8.2 K/UL (ref 4.8–10.8)

## 2024-07-01 PROCEDURE — 85025 COMPLETE CBC W/AUTO DIFF WBC: CPT

## 2024-07-01 PROCEDURE — 80053 COMPREHEN METABOLIC PANEL: CPT

## 2024-07-01 PROCEDURE — 83036 HEMOGLOBIN GLYCOSYLATED A1C: CPT

## 2024-07-01 PROCEDURE — 84443 ASSAY THYROID STIM HORMONE: CPT

## 2024-07-09 ENCOUNTER — APPOINTMENT (OUTPATIENT)
Dept: RADIOLOGY | Facility: CLINIC | Age: 29
End: 2024-07-09
Payer: COMMERCIAL

## 2024-07-09 ENCOUNTER — APPOINTMENT (OUTPATIENT)
Dept: MEDICAL GROUP | Facility: CLINIC | Age: 29
End: 2024-07-09
Payer: COMMERCIAL

## 2024-07-09 VITALS
HEART RATE: 74 BPM | RESPIRATION RATE: 16 BRPM | OXYGEN SATURATION: 97 % | BODY MASS INDEX: 21.22 KG/M2 | HEIGHT: 68 IN | SYSTOLIC BLOOD PRESSURE: 107 MMHG | DIASTOLIC BLOOD PRESSURE: 71 MMHG | WEIGHT: 140 LBS

## 2024-07-09 DIAGNOSIS — M54.2 NECK PAIN: ICD-10-CM

## 2024-07-09 DIAGNOSIS — M99.01 SOMATIC DYSFUNCTION OF CERVICAL REGION: ICD-10-CM

## 2024-07-09 PROCEDURE — 73000 X-RAY EXAM OF COLLAR BONE: CPT | Mod: TC,LT | Performed by: RADIOLOGY

## 2024-07-09 PROCEDURE — 3078F DIAST BP <80 MM HG: CPT

## 2024-07-09 PROCEDURE — 98926 OSTEOPATH MANJ 3-4 REGIONS: CPT | Mod: GE

## 2024-07-09 PROCEDURE — 99213 OFFICE O/P EST LOW 20 MIN: CPT | Mod: GE

## 2024-07-09 PROCEDURE — 3074F SYST BP LT 130 MM HG: CPT

## 2024-07-09 PROCEDURE — 72040 X-RAY EXAM NECK SPINE 2-3 VW: CPT | Mod: TC | Performed by: RADIOLOGY

## 2024-07-09 ASSESSMENT — FIBROSIS 4 INDEX: FIB4 SCORE: 0.31

## 2024-08-09 ENCOUNTER — APPOINTMENT (OUTPATIENT)
Dept: MEDICAL GROUP | Facility: CLINIC | Age: 29
End: 2024-08-09
Payer: COMMERCIAL

## 2024-08-17 ENCOUNTER — APPOINTMENT (OUTPATIENT)
Dept: URGENT CARE | Facility: PHYSICIAN GROUP | Age: 29
End: 2024-08-17
Payer: MEDICAID

## 2024-10-23 ENCOUNTER — OFFICE VISIT (OUTPATIENT)
Dept: URGENT CARE | Facility: PHYSICIAN GROUP | Age: 29
End: 2024-10-23
Payer: COMMERCIAL

## 2024-10-23 VITALS
HEART RATE: 72 BPM | HEIGHT: 68 IN | TEMPERATURE: 97.6 F | WEIGHT: 135.9 LBS | OXYGEN SATURATION: 98 % | DIASTOLIC BLOOD PRESSURE: 60 MMHG | SYSTOLIC BLOOD PRESSURE: 102 MMHG | BODY MASS INDEX: 20.6 KG/M2 | RESPIRATION RATE: 18 BRPM

## 2024-10-23 DIAGNOSIS — H00.021 HORDEOLUM INTERNUM OF RIGHT UPPER EYELID: Primary | ICD-10-CM

## 2024-10-23 PROCEDURE — 99203 OFFICE O/P NEW LOW 30 MIN: CPT | Performed by: PHYSICIAN ASSISTANT

## 2024-10-23 PROCEDURE — 3074F SYST BP LT 130 MM HG: CPT | Performed by: PHYSICIAN ASSISTANT

## 2024-10-23 PROCEDURE — 3078F DIAST BP <80 MM HG: CPT | Performed by: PHYSICIAN ASSISTANT

## 2024-10-23 RX ORDER — ERYTHROMYCIN 5 MG/G
OINTMENT OPHTHALMIC
Qty: 3.5 G | Refills: 0 | Status: SHIPPED | OUTPATIENT
Start: 2024-10-23

## 2024-10-23 ASSESSMENT — VISUAL ACUITY: OU: 1

## 2024-10-23 ASSESSMENT — FIBROSIS 4 INDEX: FIB4 SCORE: 0.31
